# Patient Record
Sex: MALE | Race: WHITE | NOT HISPANIC OR LATINO | URBAN - NONMETROPOLITAN AREA
[De-identification: names, ages, dates, MRNs, and addresses within clinical notes are randomized per-mention and may not be internally consistent; named-entity substitution may affect disease eponyms.]

---

## 2024-09-02 SDOH — HEALTH STABILITY: MENTAL HEALTH
STRESS IS WHEN SOMEONE FEELS TENSE, NERVOUS, ANXIOUS, OR CAN'T SLEEP AT NIGHT BECAUSE THEIR MIND IS TROUBLED. HOW STRESSED ARE YOU?: ONLY A LITTLE

## 2024-09-02 SDOH — ECONOMIC STABILITY: INCOME INSECURITY: IN THE LAST 12 MONTHS, WAS THERE A TIME WHEN YOU WERE NOT ABLE TO PAY THE MORTGAGE OR RENT ON TIME?: NO

## 2024-09-02 SDOH — ECONOMIC STABILITY: FOOD INSECURITY: WITHIN THE PAST 12 MONTHS, THE FOOD YOU BOUGHT JUST DIDN'T LAST AND YOU DIDN'T HAVE MONEY TO GET MORE.: NEVER TRUE

## 2024-09-02 SDOH — HEALTH STABILITY: PHYSICAL HEALTH: ON AVERAGE, HOW MANY DAYS PER WEEK DO YOU ENGAGE IN MODERATE TO STRENUOUS EXERCISE (LIKE A BRISK WALK)?: 4 DAYS

## 2024-09-02 SDOH — HEALTH STABILITY: PHYSICAL HEALTH: ON AVERAGE, HOW MANY MINUTES DO YOU ENGAGE IN EXERCISE AT THIS LEVEL?: 30 MIN

## 2024-09-02 SDOH — ECONOMIC STABILITY: FOOD INSECURITY: WITHIN THE PAST 12 MONTHS, YOU WORRIED THAT YOUR FOOD WOULD RUN OUT BEFORE YOU GOT MONEY TO BUY MORE.: NEVER TRUE

## 2024-09-02 SDOH — ECONOMIC STABILITY: TRANSPORTATION INSECURITY
IN THE PAST 12 MONTHS, HAS THE LACK OF TRANSPORTATION KEPT YOU FROM MEDICAL APPOINTMENTS OR FROM GETTING MEDICATIONS?: NO

## 2024-09-02 SDOH — ECONOMIC STABILITY: TRANSPORTATION INSECURITY
IN THE PAST 12 MONTHS, HAS LACK OF RELIABLE TRANSPORTATION KEPT YOU FROM MEDICAL APPOINTMENTS, MEETINGS, WORK OR FROM GETTING THINGS NEEDED FOR DAILY LIVING?: NO

## 2024-09-02 SDOH — ECONOMIC STABILITY: TRANSPORTATION INSECURITY
IN THE PAST 12 MONTHS, HAS LACK OF TRANSPORTATION KEPT YOU FROM MEETINGS, WORK, OR FROM GETTING THINGS NEEDED FOR DAILY LIVING?: NO

## 2024-09-02 SDOH — ECONOMIC STABILITY: HOUSING INSECURITY
IN THE LAST 12 MONTHS, WAS THERE A TIME WHEN YOU DID NOT HAVE A STEADY PLACE TO SLEEP OR SLEPT IN A SHELTER (INCLUDING NOW)?: NO

## 2024-09-02 SDOH — ECONOMIC STABILITY: INCOME INSECURITY: HOW HARD IS IT FOR YOU TO PAY FOR THE VERY BASICS LIKE FOOD, HOUSING, MEDICAL CARE, AND HEATING?: NOT HARD AT ALL

## 2024-09-02 ASSESSMENT — SOCIAL DETERMINANTS OF HEALTH (SDOH)
DO YOU BELONG TO ANY CLUBS OR ORGANIZATIONS SUCH AS CHURCH GROUPS UNIONS, FRATERNAL OR ATHLETIC GROUPS, OR SCHOOL GROUPS?: NO
IN A TYPICAL WEEK, HOW MANY TIMES DO YOU TALK ON THE PHONE WITH FAMILY, FRIENDS, OR NEIGHBORS?: MORE THAN THREE TIMES A WEEK
HOW HARD IS IT FOR YOU TO PAY FOR THE VERY BASICS LIKE FOOD, HOUSING, MEDICAL CARE, AND HEATING?: NOT HARD AT ALL
HOW OFTEN DO YOU ATTEND CHURCH OR RELIGIOUS SERVICES?: NEVER
HOW OFTEN DO YOU ATTEND CHURCH OR RELIGIOUS SERVICES?: NEVER
WITHIN THE PAST 12 MONTHS, YOU WORRIED THAT YOUR FOOD WOULD RUN OUT BEFORE YOU GOT THE MONEY TO BUY MORE: NEVER TRUE
IN A TYPICAL WEEK, HOW MANY TIMES DO YOU TALK ON THE PHONE WITH FAMILY, FRIENDS, OR NEIGHBORS?: MORE THAN THREE TIMES A WEEK
HOW OFTEN DO YOU HAVE A DRINK CONTAINING ALCOHOL: 2-4 TIMES A MONTH
HOW OFTEN DO YOU GET TOGETHER WITH FRIENDS OR RELATIVES?: THREE TIMES A WEEK
HOW MANY DRINKS CONTAINING ALCOHOL DO YOU HAVE ON A TYPICAL DAY WHEN YOU ARE DRINKING: 3 OR 4
IN THE PAST 12 MONTHS, HAS THE ELECTRIC, GAS, OIL, OR WATER COMPANY THREATENED TO SHUT OFF SERVICE IN YOUR HOME?: NO
HOW OFTEN DO YOU GET TOGETHER WITH FRIENDS OR RELATIVES?: THREE TIMES A WEEK
DO YOU BELONG TO ANY CLUBS OR ORGANIZATIONS SUCH AS CHURCH GROUPS UNIONS, FRATERNAL OR ATHLETIC GROUPS, OR SCHOOL GROUPS?: NO
HOW OFTEN DO YOU ATTENT MEETINGS OF THE CLUB OR ORGANIZATION YOU BELONG TO?: NEVER
HOW OFTEN DO YOU ATTENT MEETINGS OF THE CLUB OR ORGANIZATION YOU BELONG TO?: NEVER
HOW OFTEN DO YOU HAVE SIX OR MORE DRINKS ON ONE OCCASION: LESS THAN MONTHLY

## 2024-09-02 ASSESSMENT — LIFESTYLE VARIABLES
HOW MANY STANDARD DRINKS CONTAINING ALCOHOL DO YOU HAVE ON A TYPICAL DAY: 3 OR 4
HOW OFTEN DO YOU HAVE A DRINK CONTAINING ALCOHOL: 2-4 TIMES A MONTH
AUDIT-C TOTAL SCORE: 4
HOW OFTEN DO YOU HAVE SIX OR MORE DRINKS ON ONE OCCASION: LESS THAN MONTHLY
SKIP TO QUESTIONS 9-10: 0

## 2024-09-03 ENCOUNTER — APPOINTMENT (OUTPATIENT)
Dept: MEDICAL GROUP | Facility: PHYSICIAN GROUP | Age: 59
End: 2024-09-03

## 2024-09-03 VITALS
HEART RATE: 68 BPM | DIASTOLIC BLOOD PRESSURE: 88 MMHG | TEMPERATURE: 96.9 F | BODY MASS INDEX: 37.88 KG/M2 | SYSTOLIC BLOOD PRESSURE: 126 MMHG | OXYGEN SATURATION: 94 % | HEIGHT: 74 IN | WEIGHT: 295.2 LBS | RESPIRATION RATE: 16 BRPM

## 2024-09-03 DIAGNOSIS — R73.03 PREDIABETES: ICD-10-CM

## 2024-09-03 DIAGNOSIS — Z00.00 ANNUAL PHYSICAL EXAM: ICD-10-CM

## 2024-09-03 DIAGNOSIS — Z12.83 SKIN CANCER SCREENING: ICD-10-CM

## 2024-09-03 DIAGNOSIS — Z12.5 SCREENING PSA (PROSTATE SPECIFIC ANTIGEN): ICD-10-CM

## 2024-09-03 DIAGNOSIS — Z13.220 LIPID SCREENING: ICD-10-CM

## 2024-09-03 DIAGNOSIS — I10 PRIMARY HYPERTENSION: ICD-10-CM

## 2024-09-03 DIAGNOSIS — G47.33 OSA ON CPAP: ICD-10-CM

## 2024-09-03 DIAGNOSIS — E66.01 CLASS 2 SEVERE OBESITY DUE TO EXCESS CALORIES WITH SERIOUS COMORBIDITY AND BODY MASS INDEX (BMI) OF 37.0 TO 37.9 IN ADULT (HCC): ICD-10-CM

## 2024-09-03 DIAGNOSIS — Z11.59 NEED FOR HEPATITIS C SCREENING TEST: ICD-10-CM

## 2024-09-03 DIAGNOSIS — E66.01 MORBID (SEVERE) OBESITY DUE TO EXCESS CALORIES (HCC): ICD-10-CM

## 2024-09-03 DIAGNOSIS — E66.9 OBESITY (BMI 30-39.9): ICD-10-CM

## 2024-09-03 DIAGNOSIS — Z23 NEED FOR VACCINATION: ICD-10-CM

## 2024-09-03 PROBLEM — E66.09 OBESITY DUE TO EXCESS CALORIES WITH SERIOUS COMORBIDITY: Status: ACTIVE | Noted: 2024-09-03

## 2024-09-03 PROCEDURE — 90471 IMMUNIZATION ADMIN: CPT | Performed by: STUDENT IN AN ORGANIZED HEALTH CARE EDUCATION/TRAINING PROGRAM

## 2024-09-03 PROCEDURE — 99386 PREV VISIT NEW AGE 40-64: CPT | Mod: 25 | Performed by: STUDENT IN AN ORGANIZED HEALTH CARE EDUCATION/TRAINING PROGRAM

## 2024-09-03 PROCEDURE — 3074F SYST BP LT 130 MM HG: CPT | Performed by: STUDENT IN AN ORGANIZED HEALTH CARE EDUCATION/TRAINING PROGRAM

## 2024-09-03 PROCEDURE — 90746 HEPB VACCINE 3 DOSE ADULT IM: CPT | Performed by: STUDENT IN AN ORGANIZED HEALTH CARE EDUCATION/TRAINING PROGRAM

## 2024-09-03 PROCEDURE — 3079F DIAST BP 80-89 MM HG: CPT | Performed by: STUDENT IN AN ORGANIZED HEALTH CARE EDUCATION/TRAINING PROGRAM

## 2024-09-03 RX ORDER — TRIAMCINOLONE ACETONIDE 55 UG/1
SPRAY, METERED NASAL
COMMUNITY
Start: 2024-02-23 | End: 2025-02-22

## 2024-09-03 RX ORDER — LISINOPRIL 10 MG/1
10 TABLET ORAL DAILY
Qty: 90 TABLET | Refills: 0 | Status: SHIPPED | OUTPATIENT
Start: 2024-09-03

## 2024-09-03 RX ORDER — LISINOPRIL AND HYDROCHLOROTHIAZIDE 12.5; 2 MG/1; MG/1
1 TABLET ORAL DAILY
COMMUNITY
Start: 2024-04-03 | End: 2024-09-03

## 2024-09-03 ASSESSMENT — PATIENT HEALTH QUESTIONNAIRE - PHQ9: CLINICAL INTERPRETATION OF PHQ2 SCORE: 0

## 2024-09-03 ASSESSMENT — ENCOUNTER SYMPTOMS
SHORTNESS OF BREATH: 0
WHEEZING: 0
VOMITING: 0
DEPRESSION: 0
ABDOMINAL PAIN: 0
HEARTBURN: 0
HEADACHES: 0
NAUSEA: 0
FOCAL WEAKNESS: 0
PALPITATIONS: 0
CHILLS: 0
DIARRHEA: 0
ORTHOPNEA: 0
DIZZINESS: 0
FEVER: 0
COUGH: 0
BLOOD IN STOOL: 0

## 2024-09-03 NOTE — LETTER
[place]  Mike Villa D.O.  [address]  [facility] [address]  Fax: [phone]   Authorization for Release/Disclosure of   Protected Health Information   Name: ERICK HERNANDEZ : 1965 SSN: [id number]   Address: [address] [place] [id number] Phone:    [phone] (home)    I authorize the entity listed below to release/disclose the PHI below to:   [facility]/Mike Villa D.O. and Mike Villa D.O.   Provider or Entity Name:  Drake Russell   Address   City, [facility], [facility]  Phone:      Fax:     Reason for request: continuity of care   Information to be released:    [  ] LAST COLONOSCOPY,  including any PATH REPORT and follow-up  [  ] LAST FIT/COLOGUARD RESULT [  ] LAST DEXA  [  ] LAST MAMMOGRAM  [  ] LAST PAP  [  ] LAST LABS [  ] RETINA EXAM REPORT  [  ] IMMUNIZATION RECORDS  [ x ] Release all info      [  ] Check here and initial the line next to each item to release ALL health information INCLUDING  _____ Care and treatment for drug and / or alcohol abuse  _____ HIV testing, infection status, or AIDS  _____ Genetic Testing    DATES OF SERVICE OR TIME PERIOD TO BE DISCLOSED: _____________  I understand and acknowledge that:  * This Authorization may be revoked at any time by you in writing, except if your health information has already been used or disclosed.  * Your health information that will be used or disclosed as a result of you signing this authorization could be re-disclosed by the recipient. If this occurs, your re-disclosed health information may no longer be protected by State or Federal laws.  * You may refuse to sign this Authorization. Your refusal will not affect your ability to obtain treatment.  * This Authorization becomes effective upon signing and will  on (date) __________.      If no date is indicated, this Authorization will  one (1) year from the signature date.    Name: Erick Hernandez  Signature: Date:   9/3/2024      PLEASE FAX REQUESTED RECORDS BACK TO: [phone]

## 2024-09-03 NOTE — PROGRESS NOTES
Subjective:   HISTORY OF THE PRESENT ILLNESS: Patient is a 58 y.o. male here today to establish care. His/her prior PCP was Drake.    Denies any concerns.   Would like to see derm for yearly skin exams because it runs in his family he has no concerns lesions    Problem   Prediabetes   Mitchell On Cpap   Body Mass Index (Bmi) 37.0-37.9, Adult   Morbid (Severe) Obesity Due to Excess Calories (Hcc)   Hypertension    Reports only takes prinzide 20-12.5 occasionally. Does not check bp at home but reports when he does it is usually in goal. Reports would like to avoid sunsensitivy side effect associated with HCTZ            Review of systems:  Review of Systems   Constitutional:  Negative for chills and fever.   Respiratory:  Negative for cough, shortness of breath and wheezing.    Cardiovascular:  Negative for chest pain, palpitations, orthopnea and leg swelling.   Gastrointestinal:  Negative for abdominal pain, blood in stool, diarrhea, heartburn, melena, nausea and vomiting.   Genitourinary:  Negative for dysuria.   Musculoskeletal:  Negative for joint pain.   Neurological:  Negative for dizziness, focal weakness and headaches.   Psychiatric/Behavioral:  Negative for depression.          Patient Active Problem List    Diagnosis Date Noted    Prediabetes 09/03/2024    MITCHELL on CPAP 09/03/2024    Body mass index (BMI) 37.0-37.9, adult 09/03/2024    Morbid (severe) obesity due to excess calories (HCC) 09/03/2024    Hypertension      Past Surgical History:   Procedure Laterality Date    FOOT SURGERY      bunion    VASECTOMY       Social History     Tobacco Use    Smoking status: Never    Smokeless tobacco: Never   Vaping Use    Vaping status: Never Used   Substance Use Topics    Alcohol use: Yes     Alcohol/week: 1.8 oz     Types: 1 Glasses of wine, 1 Cans of beer, 1 Standard drinks or equivalent per week     Comment: My wife and I are social  drinkers. If we are with drinkers    Drug use: Never      Family History   Problem  "Relation Age of Onset    Dementia Mother         It developed in her mid eighties    Diabetes Mother         She was diagnosed at 40 type 2    Cancer Father         Removed from skin    Alcohol abuse Father         Started aa at 40    Psychiatric Illness Sister      Current Outpatient Medications   Medication Sig Dispense Refill    triamcinolone (NASACORT ALLERGY 24HR) 55 MCG/ACT nasal inhaler Use two sprays in each nostril once daily; once symptoms are controlled, reduce to 1 spray in each nostril once daily (maximum: 2 sprays in each nostril once daily).      lisinopril (PRINIVIL) 10 MG Tab Take 1 Tablet by mouth every day. 90 Tablet 0     No current facility-administered medications for this visit.       Allergies:  No Known Allergies    Allergies, past medical history, past surgical history, family history, social history reviewed and updated.    Objective:    /88   Pulse 68   Temp 36.1 °C (96.9 °F) (Temporal)   Resp 16   Ht 1.88 m (6' 2\")   Wt (!) 134 kg (295 lb 3.1 oz)   SpO2 94%   BMI 37.90 kg/m²    Body mass index is 37.9 kg/m².    Physical exam:  General: Normal appearance, no acute distress, not ill-appearing  HEENT: EOM intact, conjunctiva normal limits, negative right/left eye discharge.  Sclera anicteric  Cardiovascular: Normal rate and rhythm, no murmurs  Pulmonary: No respiratory distress, no wheezing, no rales, breath sounds normal.  Abdomen: Bowel sounds normal, flat, soft.  Musculoskeletal: No edema bilaterally  Skin: Warm, dry, no lesions  Neurological: No focal deficits, normal gait  Psychiatric: Mood within normal limits    Assessment/Plan:    Patient here for a preventive medicine visit today and to establish care.  -Reviewed all past medical history, family history, social history    -Diet and exercise appropriate counseling given  -Social determinants of health reviewed  -Tobacco, alcohol, recreational drug use: no concerns   -Occupation: retired    -Cholesterol " screening: ordered  -Diabetes screening: ordered  -AAA Screening: not indicated   - DEXA scan: not indicated     Immunizations/Infectious disease:  STI screening:dec  Safe sex practices discusssed  HIV screening: dec  Immunizations: hep b today.     Cancer screenings:  Colorectal cancer screening: colo-guard negative 1 year ago.   Prostate Cancer Screening/PSA: ordered        Problem List Items Addressed This Visit       Hypertension     Bp is in goal today  Only using prinzide 20-12.5 as needed. Advised him that if he does not check bp daily could be high and he doesn't know. Since in goal today may not need level therapy he is currently on. Dc HCTZ and go down to lisinopril 10mg daily. F/u 3 m or earlier   Labs ordered         Relevant Medications    lisinopril (PRINIVIL) 10 MG Tab    Other Relevant Orders    Basic Metabolic Panel    TSH WITH REFLEX TO FT4    Prediabetes    Relevant Orders    HEMOGLOBIN A1C    CLAUDIA on CPAP    Relevant Orders    Referral to Pulmonary and Sleep Medicine    Body mass index (BMI) 37.0-37.9, adult    Morbid (severe) obesity due to excess calories (HCC)     Associated conditions HTN, CLAUDIA  Discussed diet and exercise interventions.           Other Visit Diagnoses       Need for vaccination        Relevant Orders    Hepatitis B Vaccine Adult 20+ (Completed)    Screening PSA (prostate specific antigen)        Relevant Orders    PROSTATE SPECIFIC AG SCREENING    Need for hepatitis C screening test        Relevant Orders    HEP C VIRUS ANTIBODY    Lipid screening        Relevant Orders    Lipid Profile    Annual physical exam        Relevant Orders    CBC WITHOUT DIFFERENTIAL    Skin cancer screening        Relevant Orders    Referral to Dermatology           HCC Gap Form    Diagnosis: E66.01 - Morbid (severe) obesity due to excess calories (HCC)  Z68.37 - Body mass index (BMI) 37.0-37.9, adult  Comorbidity Diagnosis: CLAUDIA on CPAP  The current BMI is 37.90 kg/m2 as of 09/03/24 07:36  PDT  Assessment and plan: Chronic, stable. Encouraged healthy diet and physical activity changes with a goal of weight loss. Follow up at least annually. The comorbid condition is stable based on today's assessment. Continue current treatment plan. Follow up at least yearly.  Last edited 09/03/24 07:36 PDT by Mike Villa D.O.           Patient Counseling:  --Discussed moderation in caloric intake, sufficient fresh fruits/vegetables, fiber, iron  --Discussed brushing, flossing, and dental visits.   --Encouraged regular exercise.   --Discussed tobacco, alcohol, or other drug use.  --Discussed sexually transmitted infections, partner selection, use of condoms  --Injury prevention: Discussed     Return in about 3 months (around 12/3/2024), or if symptoms worsen or fail to improve.

## 2024-09-03 NOTE — ASSESSMENT & PLAN NOTE
Bp is in goal today  Only using prinzide 20-12.5 as needed. Advised him that if he does not check bp daily could be high and he doesn't know. Since in goal today may not need level therapy he is currently on. Dc HCTZ and go down to lisinopril 10mg daily. F/u 3 m or earlier   Labs ordered

## 2024-12-05 DIAGNOSIS — I10 PRIMARY HYPERTENSION: ICD-10-CM

## 2024-12-05 RX ORDER — LISINOPRIL 10 MG/1
10 TABLET ORAL DAILY
Qty: 90 TABLET | Refills: 2 | Status: SHIPPED | OUTPATIENT
Start: 2024-12-05

## 2024-12-05 NOTE — TELEPHONE ENCOUNTER
Received request via: Patient    Was the patient seen in the last year in this department? Yes    Does the patient have an active prescription (recently filled or refills available) for medication(s) requested? No    Pharmacy Name: Pharm[facility]   [id number] - [place], [place] - [address] [facility] [address]     Does the patient have [facility] and need 100-day supply? (This applies to ALL medications) Patient does not have SCP

## 2024-12-12 ENCOUNTER — TELEPHONE (OUTPATIENT)
Dept: HEALTH INFORMATION MANAGEMENT | Facility: OTHER | Age: 59
End: 2024-12-12
Payer: COMMERCIAL

## 2024-12-20 ENCOUNTER — TELEPHONE (OUTPATIENT)
Dept: HEALTH INFORMATION MANAGEMENT | Facility: OTHER | Age: 59
End: 2024-12-20
Payer: COMMERCIAL